# Patient Record
Sex: MALE | Race: WHITE
[De-identification: names, ages, dates, MRNs, and addresses within clinical notes are randomized per-mention and may not be internally consistent; named-entity substitution may affect disease eponyms.]

---

## 2018-06-07 ENCOUNTER — HOSPITAL ENCOUNTER (INPATIENT)
Dept: HOSPITAL 41 - JD.ED | Age: 33
LOS: 6 days | Discharge: NURSING, REHAB OR LONG-TERM CARE | DRG: 896 | End: 2018-06-13
Payer: COMMERCIAL

## 2018-06-07 DIAGNOSIS — Z87.891: ICD-10-CM

## 2018-06-07 DIAGNOSIS — R45.851: ICD-10-CM

## 2018-06-07 DIAGNOSIS — F43.10: ICD-10-CM

## 2018-06-07 DIAGNOSIS — K85.20: ICD-10-CM

## 2018-06-07 DIAGNOSIS — F15.93: ICD-10-CM

## 2018-06-07 DIAGNOSIS — F32.9: ICD-10-CM

## 2018-06-07 DIAGNOSIS — G47.00: ICD-10-CM

## 2018-06-07 DIAGNOSIS — F50.9: ICD-10-CM

## 2018-06-07 DIAGNOSIS — F10.229: Primary | ICD-10-CM

## 2018-06-07 DIAGNOSIS — E83.42: ICD-10-CM

## 2018-06-07 DIAGNOSIS — F41.9: ICD-10-CM

## 2018-06-07 DIAGNOSIS — F39: ICD-10-CM

## 2018-06-07 DIAGNOSIS — Z79.899: ICD-10-CM

## 2018-06-07 LAB — APAP SERPL-MCNC: 0 UG/ML (ref 10–30)

## 2018-06-07 PROCEDURE — A9270 NON-COVERED ITEM OR SERVICE: HCPCS

## 2018-06-07 PROCEDURE — C9113 INJ PANTOPRAZOLE SODIUM, VIA: HCPCS

## 2018-06-07 PROCEDURE — G0480 DRUG TEST DEF 1-7 CLASSES: HCPCS
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
## 2018-06-07 NOTE — EDM.PDOCBH
ED HPI GENERAL MEDICAL PROBLEM





- General


Chief Complaint: Drug or Alcohol Abuse


Stated Complaint: ALCOHOL DETOX


Time Seen by Provider: 06/07/18 17:02


Source of Information: Reports: Patient


History Limitations: Reports: Intoxication





- History of Present Illness


INITIAL COMMENTS - FREE TEXT/NARRATIVE: 


Patient is a 32-year-old male currently intoxicated saying he wants to 

killhimself. Patient states he recently was discharged from inpatient treatment 

for alcoholism this past Thursday. 2 hours after being discharged he started 

consuming approximately 1 L of whiskey daily. States today he drank 1 L of 

vodka with also half liter of crown. His father called the [employer] for welfare 

check. Patient does reside with his dad. When [employer] arrived patient had stated he 

wanted to cut his wrists. He made cutting motions with his fingers against his 

wrist. While being triaged he also stated he wanted to kill himself as well. He'

s had one previous attempt  one year ago after taking pills. States he was not 

hospitalized because he woke up. He admits using marijuana 22 days ago. Patient 

spent inpatient 3 times for alcoholism. Denies taking any medications today 

other than as Prozac. With these tox patient has tremors and hallucinations. No 

seizures.





  ** Headache


Pain Score (Numeric/FACES): 9





- Related Data


 Allergies











Allergy/AdvReac Type Severity Reaction Status Date / Time


 


No Known Allergies Allergy   Verified 06/07/18 16:24











Home Meds: 


 Home Meds





Sertraline [Zoloft] 20 mg PO DAILY 06/07/18 [History]


FLUoxetine HCl [Prozac] 20 mg PO DAILY 06/08/18 [History]











ED ROS GENERAL





- Review of Systems


Review Of Systems: ROS reveals no pertinent complaints other than HPI.





ED EXAM, BEHAVIORAL HEALTH





- Physical Exam


Exam: See Below


Exam Limited By: Intoxication


General Appearance: Alert, WD/WN, No Apparent Distress


Eye Exam: Bilateral Eye: EOMI, Nystagmus (horizontal present. ), PERRL


Ears: Hearing Grossly Normal


Nose: Normal Inspection


Throat/Mouth: Normal Inspection, Normal Oropharynx, Normal Voice, No Airway 

Compromise


Head: Atraumatic, Normocephalic


Neck: Normal Inspection, Supple


Respiratory/Chest: No Respiratory Distress, Lungs Clear, Normal Breath Sounds, 

No Accessory Muscle Use, Chest Non-Tender


Cardiovascular: Normal Peripheral Pulses, Regular Rate, Rhythm, No Murmur


GI/Abdominal: Normal Bowel Sounds, Soft, Non-Tender, No Distention


Back Exam: Normal Inspection


Extremities: Normal Inspection


Neurological: Alert, Normal Mood/Affect, CN II-XII Intact, Normal Cognition, No 

Motor/Sensory Deficits, Oriented x 3


Psychiatric: Alert, Normal Affect, Normal Cognition, Normal Mood, Oriented, 

Suicidal Plan, Suicidal Thoughts.  No: Depressed Mood, Flat Affect, Incoherent, 

Restless, Tearful, Agitated, Disoriented, Inattentive, Non-Communicative, Poor 

Eye Contact, Uncooperative, Withdrawn, Flight of Ideas, Homicidal Thoughts, 

Phobic, Mormon Delusions, Tangential Thoughts, Auditory Hallucinations, 

Visual Hallucinations, Grandiose Thoughts, Pressured Speech, Paranoid Thoughts, 

Threatening Behavior, Other


Skin Exam: Warm, Dry, Intact, Normal color, No rash





COURSE, BEHAVIORAL HEALTH COMP





- Course


Vital Signs: 


 Last Vital Signs











Temp  97.7 F   06/10/18 20:05


 


Pulse  69   06/10/18 20:05


 


Resp  20   06/10/18 20:05


 


BP  159/93 H  06/10/18 20:05


 


Pulse Ox  99   06/10/18 20:05











Orders, Labs, Meds: 


 Medication Orders





Acetaminophen/Butalbital/Caffeine (Fioricet 325-50-40 Mg)  1 tab PO Q6H PRN


   PRN Reason: Headache


   Last Admin: 06/10/18 08:39  Dose: 1 tab


Albuterol/Ipratropium (Duoneb 3.0-0.5 Mg/3 Ml)  3 ml NEB Q4H PRN


   PRN Reason: Shortness Of Breath/wheezing


Famotidine (Pepcid)  20 mg PO Q12H [facility]


   Last Admin: 06/10/18 08:38  Dose: 20 mg


   Admin: 06/09/18 21:22  Dose: 20 mg


   Admin: 06/09/18 09:42  Dose: 20 mg


   Admin: 06/08/18 21:06  Dose: 20 mg


Fluoxetine HCl (Prozac)  40 mg PO DAILY [facility]


   Last Admin: 06/10/18 08:41  Dose: 40 mg


   Admin: 06/09/18 09:41  Dose: 40 mg


   Admin: 06/08/18 10:51  Dose: 40 mg


Haloperidol Lactate (Haldol)  2 mg IM Q4H PRN


   PRN Reason: Agitation


Hydralazine HCl (Apresoline)  20 mg IVPUSH Q4H PRN


   PRN Reason: Hypertension


Promethazine HCl 12.5 mg/ (Sodium Chloride)  50.5 mls @ 100 mls/hr IV Q6H PRN


   PRN Reason: Nausea/Vomiting


Ibuprofen (Motrin)  600 mg PO Q6H PRN


   PRN Reason: Pain (moderate 4-6)


   Last Admin: 06/09/18 19:36  Dose: 600 mg


   Admin: 06/08/18 18:13  Dose: 600 mg


   Admin: 06/08/18 09:02  Dose: 600 mg


Lorazepam (Ativan)  2 mg IVPUSH Q4H PRN


   PRN Reason: Seizures


Lorazepam (Ativan)  1 mg IVPUSH Q6H PRN


   PRN Reason: Anxiety


Magnesium Sulfate (Pharmacy To Dose - Magnesium Replacement)  1 dose .XX 

ASDIRECTED [facility]


Metoprolol Tartrate (Lopressor)  5 mg IVPUSH Q4H PRN


   PRN Reason: Tachycardia


Miscellaneous Information (Remove Patch)  1 ea TRDERM DAILY PRN


   PRN Reason: remove patch


Nicotine (Habitrol)  21 mg TRDERM DAILY PRN


   PRN Reason: Smoking


   Last Admin: 06/07/18 22:32  Dose: 21 mg


Ondansetron HCl (Zofran)  4 mg IV Q6H PRN


   PRN Reason: Nausea/Vomiting


Oxycodone HCl (Oxycodone)  5 mg PO Q4H PRN


   PRN Reason: Pain (moderate 4-6)


Potassium Chloride (Pharmacy To Dose - Potassium Replacement)  1 dose .XX 

ASDIRECTED [facility]


Quetiapine Fumarate (Seroquel)  25 mg PO BID [facility]


Thiamine HCl (Vitamin B-1)  100 mg PO DAILY [facility]


   Last Admin: 06/10/18 08:40  Dose: 100 mg


   Admin: 06/09/18 09:42  Dose: 100 mg


   Admin: 06/08/18 08:05  Dose: 100 mg


Topiramate (Topamax)  25 mg PO BID [facility]


   Last Admin: 06/10/18 08:41  Dose: 25 mg


   Admin: 06/09/18 21:22  Dose: 25 mg


   Admin: 06/09/18 09:42  Dose: 25 mg


   Admin: 06/08/18 21:07  Dose: 25 mg


   Admin: 06/08/18 08:11  Dose: 25 mg





 Laboratory Tests











  06/07/18 06/07/18 06/07/18 Range/Units





  16:50 16:50 18:30 


 


WBC    6.54  (4.23-9.07)  K/mm3


 


RBC    5.25  (4.63-6.08)  M/mm3


 


Hgb    16.5  (13.7-17.5)  gm/L


 


Hct    47.1  (40.1-51.0)  %


 


MCV    89.7  (79.0-92.2)  fl


 


MCH    31.4  (25.7-32.2)  pg


 


MCHC    35.0  (32.2-35.5)  g/dl


 


RDW Std Deviation    42.4  (35.1-43.9)  fL


 


Plt Count    269  (163-337)  K/mm3


 


MPV    8.9 L  (9.4-12.3)  fl


 


Neutrophils % (Manual)    36 L  (40-60)  %


 


Band Neutrophils %    0  (0-10)  %


 


Lymphocytes % (Manual)    63 H  (20-40)  %


 


Atypical Lymphs %    0  %


 


Monocytes % (Manual)    1 L  (2-10)  %


 


Eosinophils % (Manual)    0 L  (0.8-7.0)  %


 


Basophils % (Manual)    0 L  (0.2-1.2)  


 


Platelet Estimate    Adequate  


 


Plt Morphology Comment    Normal  


 


RBC Morph Comment    Normal  


 


Sodium     (136-145)  mEq/L


 


Potassium     (3.5-5.1)  mEq/L


 


Chloride     ()  mEq/L


 


Carbon Dioxide     (21-32)  mEq/L


 


Anion Gap     (5-15)  


 


BUN     (7-18)  mg/dL


 


Creatinine     (0.7-1.3)  mg/dL


 


Est Cr Clr Drug Dosing     mL/min


 


Estimated GFR (MDRD)     (>60)  mL/min


 


BUN/Creatinine Ratio     (14-18)  


 


Glucose     ()  mg/dL


 


Calcium     (8.5-10.1)  mg/dL


 


Magnesium     (1.8-2.4)  mg/dl


 


Total Bilirubin     (0.2-1.0)  mg/dL


 


AST     (15-37)  U/L


 


ALT     (16-63)  U/L


 


Alkaline Phosphatase     ()  U/L


 


C-Reactive Protein     (<1.0)  mg/dL


 


Total Protein     (6.4-8.2)  g/dl


 


Albumin     (3.4-5.0)  g/dl


 


Globulin     gm/dL


 


Albumin/Globulin Ratio     (1-2)  


 


Lipase     ()  U/L


 


TSH 3rd Generation     (0.358-3.74)  uIU/mL


 


Urine Color  Light yellow    (Yellow)  


 


Urine Appearance  Clear    (Clear)  


 


Urine pH  7.0    (5.0-8.0)  


 


Ur Specific Gravity  1.010    (1.005-1.030)  


 


Urine Protein  Negative    (Negative)  


 


Urine Glucose (UA)  Negative    (Negative)  


 


Urine Ketones  Negative    (Negative)  


 


Urine Occult Blood  Trace-lysed H    (Negative)  


 


Urine Nitrite  Negative    (Negative)  


 


Urine Bilirubin  Negative    (Negative)  


 


Urine Urobilinogen  0.2    (0.2-1.0)  


 


Ur Leukocyte Esterase  Negative    (Negative)  


 


Urine RBC  0-5    (0-5)  /hpf


 


Urine WBC  Not seen    (0-5)  /hpf


 


Ur Epithelial Cells  Not seen    (0-5)  /hpf


 


Urine Bacteria  Not seen    (FEW)  /hpf


 


Urine Mucus  Not seen    (FEW)  /hpf


 


Salicylates     (2.8-20)  mg/dL


 


Urine Opiates Screen   Negative   (NEGATIVE)  


 


Ur Buprenorphine Scrn   Negative   (NEGATIVE)  


 


Ur Oxycodone Screen   Negative   (NEGATIVE)  


 


Urine Methadone Screen   Negative   (NEGATIVE)  


 


Ur Propoxyphene Screen   Negative   (NEGATIVE)  


 


Acetaminophen     (10-30)  ug/mL


 


Ur Barbiturates Screen   Negative   (NEGATIVE)  


 


Ur Tricyclics Screen   Negative   (NEGATIVE)  


 


Ur Phencyclidine Scrn   Negative   (NEGATIVE)  


 


Ur Amphetamine Screen   Negative   (NEGATIVE)  


 


U Methamphetamines Scrn   Negative   (NEGATIVE)  


 


U Benzodiazepines Scrn   Negative   (NEGATIVE)  


 


U Cocaine Metab Screen   Negative   (NEGATIVE)  


 


U Marijuana (THC) Screen   Negative   (NEGATIVE)  


 


Ethyl Alcohol     (0.00)  gm%














  06/07/18 06/07/18 06/07/18 Range/Units





  18:30 18:30 18:30 


 


WBC     (4.23-9.07)  K/mm3


 


RBC     (4.63-6.08)  M/mm3


 


Hgb     (13.7-17.5)  gm/L


 


Hct     (40.1-51.0)  %


 


MCV     (79.0-92.2)  fl


 


MCH     (25.7-32.2)  pg


 


MCHC     (32.2-35.5)  g/dl


 


RDW Std Deviation     (35.1-43.9)  fL


 


Plt Count     (163-337)  K/mm3


 


MPV     (9.4-12.3)  fl


 


Neutrophils % (Manual)     (40-60)  %


 


Band Neutrophils %     (0-10)  %


 


Lymphocytes % (Manual)     (20-40)  %


 


Atypical Lymphs %     %


 


Monocytes % (Manual)     (2-10)  %


 


Eosinophils % (Manual)     (0.8-7.0)  %


 


Basophils % (Manual)     (0.2-1.2)  


 


Platelet Estimate     


 


Plt Morphology Comment     


 


RBC Morph Comment     


 


Sodium  145    (136-145)  mEq/L


 


Potassium  3.9    (3.5-5.1)  mEq/L


 


Chloride  107    ()  mEq/L


 


Carbon Dioxide  26    (21-32)  mEq/L


 


Anion Gap  15.9 H    (5-15)  


 


BUN  13    (7-18)  mg/dL


 


Creatinine  1.2    (0.7-1.3)  mg/dL


 


Est Cr Clr Drug Dosing  88.38    mL/min


 


Estimated GFR (MDRD)  > 60    (>60)  mL/min


 


BUN/Creatinine Ratio  10.8 L    (14-18)  


 


Glucose  116 H    ()  mg/dL


 


Calcium  8.8    (8.5-10.1)  mg/dL


 


Magnesium  2.1    (1.8-2.4)  mg/dl


 


Total Bilirubin  0.3    (0.2-1.0)  mg/dL


 


AST  30    (15-37)  U/L


 


ALT  43    (16-63)  U/L


 


Alkaline Phosphatase  43 L    ()  U/L


 


C-Reactive Protein    < 0.2  (<1.0)  mg/dL


 


Total Protein  7.9    (6.4-8.2)  g/dl


 


Albumin  4.5    (3.4-5.0)  g/dl


 


Globulin  3.4    gm/dL


 


Albumin/Globulin Ratio  1.3    (1-2)  


 


Lipase  1444 H    ()  U/L


 


TSH 3rd Generation  0.911    (0.358-3.74)  uIU/mL


 


Urine Color     (Yellow)  


 


Urine Appearance     (Clear)  


 


Urine pH     (5.0-8.0)  


 


Ur Specific Gravity     (1.005-1.030)  


 


Urine Protein     (Negative)  


 


Urine Glucose (UA)     (Negative)  


 


Urine Ketones     (Negative)  


 


Urine Occult Blood     (Negative)  


 


Urine Nitrite     (Negative)  


 


Urine Bilirubin     (Negative)  


 


Urine Urobilinogen     (0.2-1.0)  


 


Ur Leukocyte Esterase     (Negative)  


 


Urine RBC     (0-5)  /hpf


 


Urine WBC     (0-5)  /hpf


 


Ur Epithelial Cells     (0-5)  /hpf


 


Urine Bacteria     (FEW)  /hpf


 


Urine Mucus     (FEW)  /hpf


 


Salicylates   1.1 L   (2.8-20)  mg/dL


 


Urine Opiates Screen     (NEGATIVE)  


 


Ur Buprenorphine Scrn     (NEGATIVE)  


 


Ur Oxycodone Screen     (NEGATIVE)  


 


Urine Methadone Screen     (NEGATIVE)  


 


Ur Propoxyphene Screen     (NEGATIVE)  


 


Acetaminophen  0 L    (10-30)  ug/mL


 


Ur Barbiturates Screen     (NEGATIVE)  


 


Ur Tricyclics Screen     (NEGATIVE)  


 


Ur Phencyclidine Scrn     (NEGATIVE)  


 


Ur Amphetamine Screen     (NEGATIVE)  


 


U Methamphetamines Scrn     (NEGATIVE)  


 


U Benzodiazepines Scrn     (NEGATIVE)  


 


U Cocaine Metab Screen     (NEGATIVE)  


 


U Marijuana (THC) Screen     (NEGATIVE)  


 


Ethyl Alcohol  0.30    (0.00)  gm%








Medications











Generic Name Dose Route Start Last Admin





  Trade Name Freq  PRN Reason Stop Dose Admin


 


Acetaminophen/Butalbital/Caffeine  1 tab  06/09/18 11:24  06/10/18 08:39





  Fioricet 325-50-40 Mg  PO   1 tab





  Q6H PRN   Administration





  Headache   





     





     





     


 


Albuterol/Ipratropium  3 ml  06/07/18 20:57  





  Duoneb 3.0-0.5 Mg/3 Ml  NEB   





  Q4H PRN   





  Shortness Of Breath/wheezing   





     





     





     


 


Famotidine  20 mg  06/08/18 21:00  06/10/18 08:38





  Pepcid  PO   20 mg





  Q12H ESTEFANY   Administration





     





     





     





     


 


Fluoxetine HCl  40 mg  06/08/18 10:45  06/10/18 08:41





  Prozac  PO   40 mg





  DAILY ESTEFANY   Administration





     





     





     





     


 


Haloperidol Lactate  2 mg  06/07/18 21:02  





  Haldol  IM   





  Q4H PRN   





  Agitation   





     





     





     


 


Hydralazine HCl  20 mg  06/07/18 20:53  





  Apresoline  IVPUSH   





  Q4H PRN   





  Hypertension   





     





     





     


 


Promethazine HCl 12.5 mg/  50.5 mls @ 100 mls/hr  06/07/18 20:57  





  Sodium Chloride  IV   





  Q6H PRN   





  Nausea/Vomiting   





     





     





     


 


Ibuprofen  600 mg  06/07/18 20:57  06/09/18 19:36





  Motrin  PO   600 mg





  Q6H PRN   Administration





  Pain (moderate 4-6)   





     





     





     


 


Lorazepam  2 mg  06/08/18 05:31  





  Ativan  IVPUSH   





  Q4H PRN   





  Seizures   





     





     





     


 


Lorazepam  1 mg  06/10/18 07:14  





  Ativan  IVPUSH   





  Q6H PRN   





  Anxiety   





     





     





     


 


Magnesium Sulfate  1 dose  06/07/18 21:00  





  Pharmacy To Dose - Magnesium Replacement  .XX   





  ASDIRECTED [facility]   





     





     





     





     


 


Metoprolol Tartrate  5 mg  06/07/18 20:53  





  Lopressor  IVPUSH   





  Q4H PRN   





  Tachycardia   





     





     





     


 


Miscellaneous Information  1 ea  06/07/18 21:13  





  Remove Patch  TRDERM   





  DAILY PRN   





  remove patch   





     





     





     


 


Nicotine  21 mg  06/07/18 21:02  06/07/18 22:32





  Habitrol  TRDERM   21 mg





  DAILY PRN   Administration





  Smoking   





     





     





     


 


Ondansetron HCl  4 mg  06/07/18 20:57  





  Zofran  IV   





  Q6H PRN   





  Nausea/Vomiting   





     





     





     


 


Oxycodone HCl  5 mg  06/07/18 20:57  





  Oxycodone  PO   





  Q4H PRN   





  Pain (moderate 4-6)   





     





     





     


 


Potassium Chloride  1 dose  06/07/18 21:00  





  Pharmacy To Dose - Potassium Replacement  .XX   





  ASDIRECTED [facility]   





     





     





     





     


 


Quetiapine Fumarate  25 mg  06/10/18 21:00  





  Seroquel  PO   





  BID [facility]   





     





     





     





     


 


Thiamine HCl  100 mg  06/08/18 09:00  06/10/18 08:40





  Vitamin B-1  PO   100 mg





  DAILY ESTEFANY   Administration





     





     





     





     


 


Topiramate  25 mg  06/08/18 09:00  06/10/18 08:41





  Topamax  PO   25 mg





  BID ESTEFANY   Administration





     





     





     





     














Discontinued Medications














Generic Name Dose Route Start Last Admin





  Trade Name Freq  PRN Reason Stop Dose Admin


 


Chlordiazepoxide HCl  25 mg  06/07/18 19:37  06/07/18 19:42





  Librium  PO  06/07/18 19:38  25 mg





  ONETIME ONE   Administration





     





     





     





     


 


Chlordiazepoxide HCl  50 mg  06/07/18 20:56  06/07/18 21:32





  Librium  PO  06/07/18 20:57  50 mg





  ONETIME ONE   Administration





     





     





     





     


 


Chlordiazepoxide HCl  25 mg  06/07/18 21:02  06/08/18 05:19





  Librium  PO   25 mg





  Q8H PRN   Administration





  Withdrawal Symptoms   





     





     





     


 


Citric Acid/Sodium Citrate  30 ml  06/07/18 16:34  06/07/18 22:49





  Bicitra Solution  PO  06/07/18 16:35  Not Given





  ONETIME ONE   





     





     





     





     


 


Diphenhydramine HCl  50 mg  06/08/18 19:06  





  Benadryl  IVPUSH   





  BEDTIME PRN   





  Sleep   





     





     





     


 


Famotidine  20 mg  06/07/18 16:34  06/07/18 22:49





  Pepcid  IVPUSH  06/07/18 16:35  Not Given





  ONETIME ONE   





     





     





     





     


 


Famotidine  20 mg  06/07/18 22:15  06/08/18 10:25





  Pepcid  PO   Not Given





  Q12H ESTEFANY   





     





     





     





     


 


Folic Acid  1 mg  06/07/18 17:25  06/07/18 18:31





  Folic Acid  PO  06/07/18 17:26  1 mg





  ONETIME ONE   Administration





     





     





     





     


 


Folic Acid  1 mg  06/08/18 09:00  06/10/18 08:41





  Folic Acid  PO  06/10/18 09:01  1 mg





  DAILY ESTEFANY   Administration





     





     





     





     


 


Sodium Chloride  1,000 mls @ 150 mls/hr  06/07/18 16:45  





  Normal Saline  IV   





  ASDIRECTED ESTEFANY   





     





     





     





     


 


Sodium Chloride  1,000 mls @ 999 mls/hr  06/07/18 17:25  06/07/18 18:29





  Normal Saline  IV  06/07/18 18:25  999 mls/hr





  ONETIME ONE   Administration





     





     





     





     


 


Sodium Chloride  1,000 mls @ 125 mls/hr  06/07/18 21:00  06/08/18 06:54





  Normal Saline  IV   125 mls/hr





  ASDIRECTED ESTEFANY   Administration





     





     





     





     


 


Thiamine HCl 100 mg/ Sodium  101 mls @ 198.039 mls/hr  06/07/18 22:45  06/07/18 

22:41





  Chloride  IV  06/07/18 23:15  198.039 mls/hr





  ONETIME ONE   Administration





     





     





     





     


 


Ketamine HCl  80 mg  06/07/18 16:37  06/07/18 22:49





  Ketalar  IV  06/07/18 16:38  Not Given





  ONETIME ONE   





     





     





     





     


 


Lorazepam  2 mg  06/07/18 20:53  





  Ativan  IVPUSH   





  Q4H PRN   





  Seizures   





     





     





     


 


Lorazepam  0 mg  06/07/18 20:53  





  Ativan  IVPUSH   





  Q4H PRN   





  Withdrawal Symptoms   





     





  Protocol   





     


 


Lorazepam  0 mg  06/08/18 05:15  





  Ativan  IVPUSH   





  Q1H PRN   





  Withdrawal Symptoms   





     





  Protocol   





     


 


Lorazepam  0 mg  06/08/18 05:37  





  Ativan  IVPUSH   





  Q1H PRN   





  Withdrawal Symptoms   





     





  Protocol   





     


 


Magnesium Oxide  800 mg  06/07/18 17:25  06/07/18 18:31





  Magnesium Oxide  PO  06/07/18 17:26  800 mg





  ONETIME ONE   Administration





     





     





     





     


 


Magnesium Oxide  800 mg  06/08/18 09:00  06/08/18 08:57





  Magnesium Oxide  PO  06/08/18 09:01  800 mg





  ONETIME ONE   Administration





     





     





     





     


 


Metoclopramide HCl  10 mg  06/07/18 16:33  06/07/18 22:49





  Reglan  IVPUSH  06/07/18 16:34  Not Given





  ONETIME ONE   





     





     





     





     


 


Midazolam HCl  2 mg  06/07/18 16:33  06/07/18 22:49





  Versed 1 Mg/Ml  IVPUSH  06/07/18 16:34  Not Given





  ONETIME ONE   





     





     





     





     


 


Pantoprazole Sodium  40 mg  06/07/18 21:30  06/07/18 21:33





  Protonix Iv***  IV  06/07/18 21:31  40 mg





  ONETIME ONE   Administration





     





     





     





     


 


Quetiapine Fumarate  50 mg  06/07/18 20:54  06/07/18 21:32





  Seroquel  PO  06/07/18 20:55  50 mg





  ONETIME ONE   Administration





     





     





     





     


 


Quetiapine Fumarate  25 mg  06/08/18 21:00  





  Seroquel  PO   





  BEDTIME ESTEFANY   





     





     





     





     


 


Quetiapine Fumarate  100 mg  06/08/18 09:00  06/09/18 09:31





  Seroquel  PO   Not Given





  DAILY ESTEFANY   





     





     





     





     


 


Quetiapine Fumarate  50 mg  06/08/18 21:00  06/09/18 21:22





  Seroquel  PO   50 mg





  BEDTIME ESTEFANY   Administration





     





     





     





     


 


Quetiapine Fumarate  25 mg  06/09/18 09:30  06/10/18 08:42





  Seroquel  PO   25 mg





  DAILY ESTEFANY   Administration





     





     





     





     


 


Quetiapine Fumarate  25 mg  06/10/18 21:00  





  Seroquel  PO   





  BEDTIME ESTEFANY   





     





     





     





     


 


Sodium Chloride  10 ml  06/07/18 16:33  





  Saline Flush  FLUSH   





  ASDIRECTED PRN   





  Keep Vein Open   





     





     





     


 


Thiamine HCl  100 mg  06/07/18 17:25  06/07/18 18:31





  Vitamin B-1  PO  06/07/18 17:26  100 mg





  ONETIME ONE   Administration





     





     





     





     


 


Topiramate  25 mg  06/07/18 20:55  06/07/18 21:33





  Topamax  PO  06/07/18 20:56  25 mg





  NOW STA   Administration





     





     





     





     











Re-Assessment/Re-Exam: 


Peripheral IV established with NS, thiamine 100 mg by mouth, and using M oxide 

800 mg by mouth, folic acid 1 mg by mouth





Initial labs and studies include CBC, chem 14, urine drug screen, lipase, 

magnesium, TSH, UA, acetaminophen, salicylate, and EKG.





1814 Dr. Merino has evaluated the patient. Requests transport to [place] for 

detox. Patient is aggressive, mean, towards him. 





1916 Awaiting for labs to be resulted out. 





1930 Labs reviewed.  Patient ETOH level is 0.30.  Lipase 1444. Patient has 

pancreatitis. 





1933 Called [facility]. She will call back once hospitalists calls in. 





1949 [facility] has called back and [facility] has advised to 

admit here in [place] for detox and pancreatitis. [facility] has only 4 beds 

available and are being selective to the patients admitted.  They do have one 

psych bed for adult male available.  





1951 [facility].  Spoke with Dr. Jackson.  She requests patient be 

admitted here to Dr. Merino. Patient does not require transfer at this point. 

Can be medically managed here locally.  I have spoke with Dr. Merino. He has 

accepted the patient. Patient will be admitted to the ICU. 





 














Departure





- Departure


Time of Disposition: 20:03


Disposition: Admitted As Inpatient 66


Clinical Impression: 


 Alcohol abuse, Suicidal ideation





Pancreatitis, acute


Qualifiers:


 Pancreatitis type: alcohol induced Acute pancreatitis complication: 

unspecified Qualified Code(s): K85.20 - Alcohol induced acute pancreatitis 

without necrosis or infection








- Discharge Information
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
## 2018-06-11 NOTE — CONS
CONSULTING PHYSICIAN:  Cornelius Hernandez MD

DATE OF CONSULTATION:  06/08/2018

 

PSYCHIATRY CONSULTATION

 

This is a 60-minute inpatient clinical event.

 

IDENTIFICATION:

The patient is a 32-year-old male, who was admitted to the inpatient MICU at [facility] in [place], [place].  He is seen for psychiatric

evaluation.

 

CHIEF COMPLAINT:

"I am a severe alcoholic.  I have been struggling with that for 15 years.  It

has gotten really bad the past 2 to 3 years."

 

HISTORY OF PRESENT ILLNESS:

The patient is a 32-year-old male, who reports that he has been struggling with

alcohol dependence.  He states that he recently was released from the treatment

center this past Thursday and then he was supposed to go to aftercare, but he

states "the mistake was they left me on Thursday and did not have a plan."

Evidently, after the patient was discharged, he began drinking "a liter a day"

of hard liquor and ended up getting suicidal.  He states "I always say I wanna

kill myself when I am drunk."  He states that he was brought to the hospital,

admitted for pancreatitis and he is denying that he is suicidal now.  He states

"when I am sober, I am never like that.  If I am sober I am actually a pretty

decent tyler."  The patient states that he wants to get sober and he feels very

bad that he relapsed.  He states that he did not have a year sobriety in the

past and he was going to AA, NA, really helped him stay sober.  He also states

that he has been given Prozac for depression over the past 4 months.  He states

that complicating in his clinical situation is the fact that he engages in

bulimic like behaviors on the order of about "twice a week."  He states taking 
the Prozac medication 20 mg a day "I think that helps. I don't want to

do it as much."  The patient does report mood swings and anxiety.  Again, he is

denying any suicidal or homicidal.  He denies any psychotic, delusional, or

paranoid symptoms.  He states that he has been arranged to go back to treatment

at [place] "next Wednesday, so if I can stay safe until then I should be all

right."  The patient states that if his medication can be adjusted to help with

his bulimic behaviors on top of that, he would be open to having the medication

adjusted.  Otherwise, he has no major complaints at the moment.  He just wants

to get better medically and also get sober.

 

MEDICATIONS:

At time of presentation, none but the patient has been taking Prozac on the

outside for the past 3 to 4 months.

 

ALLERGIES:

No known drug allergies.

 

PAST MEDICAL HISTORY:

Pancreatitis.

 

REVIEW OF SYSTEMS:

Aside from GI all other major organ systems are negative at this point in time

for acute difficulties or complications.

 

FAMILY PSYCHIATRIC AND CD HISTORY:

The patient reports "there is alcoholism on both sides of my family."

 

PAST PSYCHIATRIC AND CD HISTORY:

The patient reports 2 psychiatric hospitalizations within the past year in

[place] for depression.  He reports 6 chemical dependency treatments in the

past.  He states he has been sober for 4 weeks until this most recent relapse

while he was in treatment, but then his longest sobriety has been for 1 year

when he was going to .  He has 4 DWIs in the past.  Denies any previous

suicide attempts.  Denies any self-injurious behavior history.  Again, he has

been engaged in bulimic behaviors for the past 12 years.

 

SOCIAL HISTORY:

The patient was born in [place], [place], raised in [place], [place].  He is oldest of 6 siblings.  The patient's parents were 

throughout his childhood and adolescence.  patient's highest level of education 
is 3 years of college.  He has

never been .  He is unemployed currently, but has been working as a

technician in the past, not living in [place], now he lives by himself in

[place].  He is in a current relationship and has a girlfriend.  He does not

have any biological children.  He denies any prior  service or any

current legal difficulties.  He is raised [employer].  He enjoys writing, reading,

and outdoors activities such as hunting and fishing.

 

MENTAL STATUS EXAM:

The patient is a 32-year-old soft-spoken white male in no apparent distress.

Speech is of regular rate and rhythm.  The patient is cognitively oriented.

Psychomotor activity is within normal limits.  There is no abnormal motor

movements or tics observed.  Gait and station are not observed.  The patient is

seated on the side of the bed for the entirety of the interview.  Mood is

"tired."  Affect is consistent with stated mood, but cooperative overall for the

purposes of the inpatient consult.  There is no behavioral or stated evidence of

acute suicidal or homicidal ideation or acute psychotic, delusional, or paranoid

symptoms.  Thought processes appear organized overall.  There are no manic

symptoms or loose associations evident.  Judgment and insight appear unimpaired

at this point in time.  Motivation for help is good.

 

VITALS:

126/80, 93, 18, and 97.6 degrees.

 

IMPRESSION:

Axis I:

1. Alcohol dependence, F10.20.

2. Depression, not otherwise specified, F32.9.

3. Anxiety disorder, not otherwise specified, F41.9.

4. Eating disorder-bulimia, F50.9.

5. Rule out bipolar affective disease, mixed type.

Axis II:  None.

 

Axis III:  Pancreatitis.

 

Axis IV:  Severe.

 

Axis V:  55.

 

PLAN:

1. Sobriety.

2. AA rep to visit the patient.

3. Pastoral guidance.

4. Social work to confirm that the patient has indeed rescheduled himself to

    go back to inpatient treatment once he is medically stabilized.

5. Begin trial of Topamax 25 mg b.i.d. for mood stability, anxiety reduction,

    and seizure prophylaxis.

6. Begin and increase the patient's Prozac from 20 to 40 mg q.a.m. to help

    with mood and reduction of bulimic symptoms.

7. Folic acid supplementation.

8. Thiamine supplementation.

9. Ativan per [facility] protocol.

10.Seroquel 50 mg at bedtime p.r.n. acute anxiety, agitation, insomnia, and

    mood instability.

11.Recommend be seen one-to-one in a.m. on June 09, 2018, if the patient

    remains medically stable and safe throughout the day in the evening.

12.Recommend transferring the patient back to inpatient chemical dependency

    treatment when medically stable.

13.We will continue to follow up with the patient on an as-needed basis while

    he remains on the inpatient MICU at [facility].

14.We will follow up with the patient sooner if there are any complications in

    the

    interim.

15.Crisis plan is in place.

 

DD:  06/08/2018 10:41:52

DT:  06/08/2018 17:21:19  DONNA

Job #:  [id number]

BENITA
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
## 2022-05-25 ENCOUNTER — HOSPITAL ENCOUNTER (EMERGENCY)
Dept: HOSPITAL 41 - JD.ED | Age: 37
Discharge: HOME | End: 2022-05-25
Payer: COMMERCIAL

## 2022-05-25 DIAGNOSIS — Z86.16: ICD-10-CM

## 2022-05-25 DIAGNOSIS — S29.011A: Primary | ICD-10-CM

## 2022-05-25 PROCEDURE — 85379 FIBRIN DEGRADATION QUANT: CPT

## 2022-05-25 PROCEDURE — 93005 ELECTROCARDIOGRAM TRACING: CPT

## 2022-05-25 PROCEDURE — 99285 EMERGENCY DEPT VISIT HI MDM: CPT

## 2022-05-25 PROCEDURE — 71046 X-RAY EXAM CHEST 2 VIEWS: CPT

## 2022-05-25 PROCEDURE — 36415 COLL VENOUS BLD VENIPUNCTURE: CPT

## 2022-05-25 PROCEDURE — 85025 COMPLETE CBC W/AUTO DIFF WBC: CPT

## 2022-05-25 PROCEDURE — 80053 COMPREHEN METABOLIC PANEL: CPT

## 2022-05-25 PROCEDURE — 96372 THER/PROPH/DIAG INJ SC/IM: CPT

## 2022-05-25 PROCEDURE — 84484 ASSAY OF TROPONIN QUANT: CPT

## 2023-03-30 ENCOUNTER — HOSPITAL ENCOUNTER (EMERGENCY)
Dept: HOSPITAL 41 - JD.ED | Age: 38
Discharge: HOME | End: 2023-03-30
Payer: COMMERCIAL

## 2023-03-30 DIAGNOSIS — Z20.822: ICD-10-CM

## 2023-03-30 DIAGNOSIS — Z86.16: ICD-10-CM

## 2023-03-30 DIAGNOSIS — R11.10: Primary | ICD-10-CM

## 2023-03-30 LAB
EGFRCR SERPLBLD CKD-EPI 2021: 80 ML/MIN (ref 60–?)
SARS-COV-2 RNA RESP QL NAA+PROBE: NEGATIVE

## 2023-03-30 PROCEDURE — 84443 ASSAY THYROID STIM HORMONE: CPT

## 2023-03-30 PROCEDURE — 80053 COMPREHEN METABOLIC PANEL: CPT

## 2023-03-30 PROCEDURE — 99283 EMERGENCY DEPT VISIT LOW MDM: CPT

## 2023-03-30 PROCEDURE — 85025 COMPLETE CBC W/AUTO DIFF WBC: CPT

## 2023-03-30 PROCEDURE — 0241U: CPT

## 2023-03-30 PROCEDURE — 86140 C-REACTIVE PROTEIN: CPT

## 2023-03-30 PROCEDURE — 36415 COLL VENOUS BLD VENIPUNCTURE: CPT
